# Patient Record
Sex: MALE | Race: WHITE | Employment: UNEMPLOYED | URBAN - NONMETROPOLITAN AREA
[De-identification: names, ages, dates, MRNs, and addresses within clinical notes are randomized per-mention and may not be internally consistent; named-entity substitution may affect disease eponyms.]

---

## 2018-03-08 ENCOUNTER — ANESTHESIA EVENT (OUTPATIENT)
Dept: ENDOSCOPY | Age: 63
DRG: 813 | End: 2018-03-08
Payer: MEDICARE

## 2018-03-08 ENCOUNTER — HOSPITAL ENCOUNTER (INPATIENT)
Age: 63
LOS: 3 days | Discharge: HOME | DRG: 813 | End: 2018-03-11
Attending: INTERNAL MEDICINE | Admitting: INTERNAL MEDICINE
Payer: MEDICARE

## 2018-03-08 DIAGNOSIS — E87.6 HYPOKALEMIA: Primary | ICD-10-CM

## 2018-03-08 PROBLEM — T45.511A COUMADIN TOXICITY: Status: ACTIVE | Noted: 2018-03-08

## 2018-03-08 PROBLEM — K92.2 GI BLEED: Status: ACTIVE | Noted: 2018-03-08

## 2018-03-08 PROBLEM — K92.2 GI BLEEDING: Status: ACTIVE | Noted: 2018-03-08

## 2018-03-08 PROBLEM — D62 ACUTE POST-HEMORRHAGIC ANEMIA: Status: ACTIVE | Noted: 2018-03-08

## 2018-03-08 LAB
ABO: NORMAL
ANION GAP SERPL CALCULATED.3IONS-SCNC: 9 MEQ/L (ref 8–16)
ANTIBODY SCREEN: NORMAL
BUN BLDV-MCNC: 27 MG/DL (ref 7–22)
CALCIUM SERPL-MCNC: 7.6 MG/DL (ref 8.5–10.5)
CHLORIDE BLD-SCNC: 101 MEQ/L (ref 98–111)
CO2: 27 MEQ/L (ref 23–33)
CREAT SERPL-MCNC: 0.7 MG/DL (ref 0.4–1.2)
DIGOXIN LEVEL: 0.4 NG/ML (ref 0.5–2)
GFR SERPL CREATININE-BSD FRML MDRD: > 90 ML/MIN/1.73M2
GLUCOSE BLD-MCNC: 105 MG/DL (ref 70–108)
GLUCOSE BLD-MCNC: 109 MG/DL (ref 70–108)
GLUCOSE BLD-MCNC: 111 MG/DL (ref 70–108)
HCT VFR BLD CALC: 22.4 % (ref 42–52)
HEMOGLOBIN: 7.7 GM/DL (ref 14–18)
INR BLD: 1.37 (ref 0.85–1.13)
MAGNESIUM: 2.3 MG/DL (ref 1.6–2.4)
MCH RBC QN AUTO: 29.6 PG (ref 27–31)
MCHC RBC AUTO-ENTMCNC: 34.5 GM/DL (ref 33–37)
MCV RBC AUTO: 85.7 FL (ref 80–94)
PDW BLD-RTO: 14.5 % (ref 11.5–14.5)
PLATELET # BLD: 122 THOU/MM3 (ref 130–400)
PMV BLD AUTO: 10.7 FL (ref 7.4–10.4)
POTASSIUM REFLEX MAGNESIUM: 3.1 MEQ/L (ref 3.5–5.2)
RBC # BLD: 2.62 MILL/MM3 (ref 4.7–6.1)
RH FACTOR: NORMAL
SODIUM BLD-SCNC: 137 MEQ/L (ref 135–145)
WBC # BLD: 24.3 THOU/MM3 (ref 4.8–10.8)

## 2018-03-08 PROCEDURE — 36430 TRANSFUSION BLD/BLD COMPNT: CPT

## 2018-03-08 PROCEDURE — 99291 CRITICAL CARE FIRST HOUR: CPT | Performed by: INTERNAL MEDICINE

## 2018-03-08 PROCEDURE — P9016 RBC LEUKOCYTES REDUCED: HCPCS

## 2018-03-08 PROCEDURE — 86850 RBC ANTIBODY SCREEN: CPT

## 2018-03-08 PROCEDURE — 86901 BLOOD TYPING SEROLOGIC RH(D): CPT

## 2018-03-08 PROCEDURE — 80162 ASSAY OF DIGOXIN TOTAL: CPT

## 2018-03-08 PROCEDURE — 86923 COMPATIBILITY TEST ELECTRIC: CPT

## 2018-03-08 PROCEDURE — 2580000003 HC RX 258: Performed by: INTERNAL MEDICINE

## 2018-03-08 PROCEDURE — 6360000002 HC RX W HCPCS: Performed by: INTERNAL MEDICINE

## 2018-03-08 PROCEDURE — 82948 REAGENT STRIP/BLOOD GLUCOSE: CPT

## 2018-03-08 PROCEDURE — 85610 PROTHROMBIN TIME: CPT

## 2018-03-08 PROCEDURE — 80048 BASIC METABOLIC PNL TOTAL CA: CPT

## 2018-03-08 PROCEDURE — 83735 ASSAY OF MAGNESIUM: CPT

## 2018-03-08 PROCEDURE — 6370000000 HC RX 637 (ALT 250 FOR IP): Performed by: INTERNAL MEDICINE

## 2018-03-08 PROCEDURE — 85027 COMPLETE CBC AUTOMATED: CPT

## 2018-03-08 PROCEDURE — C9113 INJ PANTOPRAZOLE SODIUM, VIA: HCPCS | Performed by: INTERNAL MEDICINE

## 2018-03-08 PROCEDURE — 36415 COLL VENOUS BLD VENIPUNCTURE: CPT

## 2018-03-08 PROCEDURE — 86900 BLOOD TYPING SEROLOGIC ABO: CPT

## 2018-03-08 PROCEDURE — 2060000000 HC ICU INTERMEDIATE R&B

## 2018-03-08 RX ORDER — SULFAMETHOXAZOLE AND TRIMETHOPRIM 400; 80 MG/1; MG/1
1 TABLET ORAL 2 TIMES DAILY
Status: ON HOLD | COMMUNITY
Start: 2018-02-28 | End: 2018-03-08 | Stop reason: ALTCHOICE

## 2018-03-08 RX ORDER — BUSPIRONE HYDROCHLORIDE 5 MG/1
5 TABLET ORAL 3 TIMES DAILY PRN
Status: DISCONTINUED | OUTPATIENT
Start: 2018-03-08 | End: 2018-03-11 | Stop reason: HOSPADM

## 2018-03-08 RX ORDER — POTASSIUM CHLORIDE 20 MEQ/1
40 TABLET, EXTENDED RELEASE ORAL ONCE
Status: COMPLETED | OUTPATIENT
Start: 2018-03-08 | End: 2018-03-08

## 2018-03-08 RX ORDER — BACLOFEN 10 MG/1
10 TABLET ORAL 3 TIMES DAILY
Status: DISCONTINUED | OUTPATIENT
Start: 2018-03-08 | End: 2018-03-08

## 2018-03-08 RX ORDER — NICOTINE POLACRILEX 4 MG
15 LOZENGE BUCCAL PRN
Status: DISCONTINUED | OUTPATIENT
Start: 2018-03-08 | End: 2018-03-11 | Stop reason: HOSPADM

## 2018-03-08 RX ORDER — ACETAMINOPHEN 325 MG/1
650 TABLET ORAL EVERY 4 HOURS PRN
Status: DISCONTINUED | OUTPATIENT
Start: 2018-03-08 | End: 2018-03-11 | Stop reason: HOSPADM

## 2018-03-08 RX ORDER — DEXTROSE MONOHYDRATE 25 G/50ML
12.5 INJECTION, SOLUTION INTRAVENOUS PRN
Status: DISCONTINUED | OUTPATIENT
Start: 2018-03-08 | End: 2018-03-11 | Stop reason: HOSPADM

## 2018-03-08 RX ORDER — ONDANSETRON 2 MG/ML
4 INJECTION INTRAMUSCULAR; INTRAVENOUS EVERY 6 HOURS PRN
Status: DISCONTINUED | OUTPATIENT
Start: 2018-03-08 | End: 2018-03-11 | Stop reason: HOSPADM

## 2018-03-08 RX ORDER — FENTANYL CITRATE 50 UG/ML
25 INJECTION, SOLUTION INTRAMUSCULAR; INTRAVENOUS
Status: DISCONTINUED | OUTPATIENT
Start: 2018-03-08 | End: 2018-03-11 | Stop reason: HOSPADM

## 2018-03-08 RX ORDER — PROMETHAZINE HYDROCHLORIDE 12.5 MG/1
12.5 TABLET ORAL EVERY 4 HOURS PRN
COMMUNITY

## 2018-03-08 RX ORDER — DEXTROSE MONOHYDRATE 50 MG/ML
100 INJECTION, SOLUTION INTRAVENOUS PRN
Status: DISCONTINUED | OUTPATIENT
Start: 2018-03-08 | End: 2018-03-11 | Stop reason: HOSPADM

## 2018-03-08 RX ORDER — POTASSIUM CHLORIDE 20 MEQ/1
40 TABLET, EXTENDED RELEASE ORAL PRN
Status: DISCONTINUED | OUTPATIENT
Start: 2018-03-08 | End: 2018-03-11 | Stop reason: HOSPADM

## 2018-03-08 RX ORDER — NITROGLYCERIN 0.4 MG/1
0.4 TABLET SUBLINGUAL EVERY 5 MIN PRN
Status: DISCONTINUED | OUTPATIENT
Start: 2018-03-08 | End: 2018-03-11 | Stop reason: HOSPADM

## 2018-03-08 RX ORDER — ZOLPIDEM TARTRATE 10 MG/1
10 TABLET ORAL NIGHTLY PRN
Status: DISCONTINUED | OUTPATIENT
Start: 2018-03-08 | End: 2018-03-11 | Stop reason: HOSPADM

## 2018-03-08 RX ORDER — POTASSIUM CHLORIDE 20 MEQ/1
20 TABLET, EXTENDED RELEASE ORAL DAILY
COMMUNITY

## 2018-03-08 RX ORDER — POTASSIUM CHLORIDE 7.45 MG/ML
10 INJECTION INTRAVENOUS PRN
Status: DISCONTINUED | OUTPATIENT
Start: 2018-03-08 | End: 2018-03-11 | Stop reason: HOSPADM

## 2018-03-08 RX ORDER — WARFARIN SODIUM 2 MG/1
TABLET ORAL DAILY
COMMUNITY

## 2018-03-08 RX ORDER — METOPROLOL TARTRATE 5 MG/5ML
2.5 INJECTION INTRAVENOUS EVERY 6 HOURS
Status: DISCONTINUED | OUTPATIENT
Start: 2018-03-08 | End: 2018-03-09

## 2018-03-08 RX ORDER — CYCLOBENZAPRINE HCL 10 MG
10 TABLET ORAL 3 TIMES DAILY PRN
COMMUNITY

## 2018-03-08 RX ORDER — DIGOXIN 0.25 MG/ML
125 INJECTION INTRAMUSCULAR; INTRAVENOUS DAILY
Status: DISCONTINUED | OUTPATIENT
Start: 2018-03-08 | End: 2018-03-09

## 2018-03-08 RX ORDER — GLIPIZIDE 10 MG/1
10 TABLET, FILM COATED, EXTENDED RELEASE ORAL DAILY
COMMUNITY

## 2018-03-08 RX ORDER — BUMETANIDE 2 MG/1
2 TABLET ORAL DAILY PRN
Status: ON HOLD | COMMUNITY
End: 2018-03-11 | Stop reason: HOSPADM

## 2018-03-08 RX ORDER — DULOXETIN HYDROCHLORIDE 60 MG/1
60 CAPSULE, DELAYED RELEASE ORAL DAILY
Status: DISCONTINUED | OUTPATIENT
Start: 2018-03-09 | End: 2018-03-11 | Stop reason: HOSPADM

## 2018-03-08 RX ORDER — ZOLPIDEM TARTRATE 10 MG/1
10 TABLET ORAL NIGHTLY PRN
COMMUNITY

## 2018-03-08 RX ORDER — METOPROLOL TARTRATE 50 MG/1
50 TABLET, FILM COATED ORAL 2 TIMES DAILY
Status: DISCONTINUED | OUTPATIENT
Start: 2018-03-08 | End: 2018-03-08

## 2018-03-08 RX ORDER — POTASSIUM CHLORIDE 20MEQ/15ML
40 LIQUID (ML) ORAL PRN
Status: DISCONTINUED | OUTPATIENT
Start: 2018-03-08 | End: 2018-03-11 | Stop reason: HOSPADM

## 2018-03-08 RX ORDER — ONDANSETRON 4 MG/1
4 TABLET, ORALLY DISINTEGRATING ORAL EVERY 4 HOURS PRN
COMMUNITY

## 2018-03-08 RX ORDER — CYCLOBENZAPRINE HCL 10 MG
10 TABLET ORAL 3 TIMES DAILY PRN
Status: DISCONTINUED | OUTPATIENT
Start: 2018-03-08 | End: 2018-03-11 | Stop reason: HOSPADM

## 2018-03-08 RX ORDER — 0.9 % SODIUM CHLORIDE 0.9 %
250 INTRAVENOUS SOLUTION INTRAVENOUS ONCE
Status: COMPLETED | OUTPATIENT
Start: 2018-03-08 | End: 2018-03-08

## 2018-03-08 RX ORDER — BUMETANIDE 2 MG/1
2 TABLET ORAL DAILY
COMMUNITY

## 2018-03-08 RX ORDER — SODIUM CHLORIDE 9 MG/ML
INJECTION, SOLUTION INTRAVENOUS CONTINUOUS
Status: DISCONTINUED | OUTPATIENT
Start: 2018-03-08 | End: 2018-03-09

## 2018-03-08 RX ORDER — FUROSEMIDE 10 MG/ML
20 INJECTION INTRAMUSCULAR; INTRAVENOUS ONCE
Status: COMPLETED | OUTPATIENT
Start: 2018-03-08 | End: 2018-03-08

## 2018-03-08 RX ORDER — SODIUM CHLORIDE 0.9 % (FLUSH) 0.9 %
10 SYRINGE (ML) INJECTION PRN
Status: DISCONTINUED | OUTPATIENT
Start: 2018-03-08 | End: 2018-03-11 | Stop reason: HOSPADM

## 2018-03-08 RX ORDER — DULOXETIN HYDROCHLORIDE 60 MG/1
60 CAPSULE, DELAYED RELEASE ORAL DAILY
COMMUNITY

## 2018-03-08 RX ORDER — ZOLPIDEM TARTRATE 5 MG/1
5 TABLET ORAL NIGHTLY PRN
Status: DISCONTINUED | OUTPATIENT
Start: 2018-03-08 | End: 2018-03-08

## 2018-03-08 RX ORDER — SODIUM CHLORIDE 0.9 % (FLUSH) 0.9 %
10 SYRINGE (ML) INJECTION EVERY 12 HOURS SCHEDULED
Status: DISCONTINUED | OUTPATIENT
Start: 2018-03-08 | End: 2018-03-11 | Stop reason: HOSPADM

## 2018-03-08 RX ORDER — DIGOXIN 125 MCG
125 TABLET ORAL DAILY
Status: DISCONTINUED | OUTPATIENT
Start: 2018-03-08 | End: 2018-03-08

## 2018-03-08 RX ADMIN — POTASSIUM CHLORIDE 40 MEQ: 1500 TABLET, EXTENDED RELEASE ORAL at 20:24

## 2018-03-08 RX ADMIN — FENTANYL CITRATE 25 MCG: 50 INJECTION INTRAMUSCULAR; INTRAVENOUS at 23:35

## 2018-03-08 RX ADMIN — FENTANYL CITRATE 25 MCG: 50 INJECTION INTRAMUSCULAR; INTRAVENOUS at 17:18

## 2018-03-08 RX ADMIN — FENTANYL CITRATE 25 MCG: 50 INJECTION INTRAMUSCULAR; INTRAVENOUS at 20:24

## 2018-03-08 RX ADMIN — SODIUM CHLORIDE: 9 INJECTION, SOLUTION INTRAVENOUS at 17:05

## 2018-03-08 RX ADMIN — FUROSEMIDE 20 MG: 10 INJECTION, SOLUTION INTRAMUSCULAR; INTRAVENOUS at 20:24

## 2018-03-08 RX ADMIN — ZOLPIDEM TARTRATE 10 MG: 10 TABLET, FILM COATED ORAL at 23:35

## 2018-03-08 RX ADMIN — SODIUM CHLORIDE 250 ML: 9 INJECTION, SOLUTION INTRAVENOUS at 18:59

## 2018-03-08 RX ADMIN — SODIUM CHLORIDE 8 MG/HR: 9 INJECTION, SOLUTION INTRAVENOUS at 17:05

## 2018-03-08 RX ADMIN — Medication 10 ML: at 20:25

## 2018-03-08 ASSESSMENT — PAIN DESCRIPTION - FREQUENCY
FREQUENCY: CONTINUOUS
FREQUENCY: CONTINUOUS

## 2018-03-08 ASSESSMENT — PAIN DESCRIPTION - PAIN TYPE
TYPE: CHRONIC PAIN
TYPE: CHRONIC PAIN

## 2018-03-08 ASSESSMENT — PAIN DESCRIPTION - LOCATION
LOCATION: BACK
LOCATION: BACK

## 2018-03-08 ASSESSMENT — PAIN SCALES - GENERAL
PAINLEVEL_OUTOF10: 10
PAINLEVEL_OUTOF10: 10
PAINLEVEL_OUTOF10: 9
PAINLEVEL_OUTOF10: 10

## 2018-03-08 ASSESSMENT — PAIN DESCRIPTION - ORIENTATION
ORIENTATION: LOWER
ORIENTATION: LOWER

## 2018-03-08 ASSESSMENT — PAIN DESCRIPTION - DIRECTION: RADIATING_TOWARDS: BILAT LOWER EXTREM

## 2018-03-08 ASSESSMENT — PAIN DESCRIPTION - ONSET: ONSET: ON-GOING

## 2018-03-08 ASSESSMENT — PAIN DESCRIPTION - DESCRIPTORS
DESCRIPTORS: CONSTANT;BURNING;TINGLING
DESCRIPTORS: ACHING;CONSTANT

## 2018-03-08 NOTE — PROGRESS NOTES
Pharmacy Medication History Note      List of current medications patient is taking is complete. Source of information: review of pharmacy dispense report, pt's daughter    Changes made to medication list:  Medications removed (include reason, ex. therapy complete or physician discontinued): · Baclofen - no longer on  · Oxycodone IR - no longer uses    Medications added/doses adjusted:  · Updated Bumex to 2 mg daily and dailyprn  · Changed Glipizide to Glipizide ER 10mg daily  · Changed Spironolactone to 50mg BID  · Changed Buspar to TIDprn  · Changed Coumadin to 2mg every other day alternating with 3mg every other day    Other notes (ex. Recent course of antibiotics, Coumadin dosing):  · Pt was recently on Bactrim DS, this was stopped due to high INR and high Potassium  · Coumadin is dosed by pt's PCP, Dr Crystal Simpson  · Denies use of other OTC or herbal medications.       Allergies reviewed      Electronically signed by Ratna Thomas, [address] [place] [address] on 3/8/2018 at 3:01 PM

## 2018-03-08 NOTE — H&P
History & Physical        Patient:  Eduardo aMta  YOB: 1955    MRN: [id number]     Acct: [de-identified]    PCP: Anastasiia Person MD    Date of Admission: 3/8/2018    Date of Service: Pt seen/examined on 3/8/18 and Admitted to Inpatient with expected LOS greater than two midnights due to medical therapy. .    Chief Complaint:  Bloody vomiting      History Of Present Illness:     70 y.o. male who presented to [address] [place] [address] from the [facility]  for further GI eval and mgt after he stayed there for  Few days when he presented with bloody vomiting and was found to be coagulopathic due to coumadin --he had an EGD and showed blood clots and the surgeon recommended referral to [facility] for further eval and treatment. His hgb was as low as 6.5 and required PRBC but despite the prbc,hgb dropped below 7. He had enough fluids. was on PPI drip. Presented with n/v and epig pain-;ocalized with LOZOYA and weakness w/o chest pain,fever,cough or diarrhea but had black to- maroon stools. No associated orthopnea or PND.had afib s/p PPM and his EF was 55% at [facility].  He was on bactrim for UTI,which could have contributed to his coagulopathy. His HR was high and was s/p Pacer interrogation,ivis said to be ok. ??     Past Medical History:          Diagnosis Date    [facility]     Brain aneurysm two aneurysm    Cardiomyopathy [facility]     CHF (congestive heart failure) (HCC)     COPD (chronic obstructive pulmonary disease) (HCC)     GERD (gastroesophageal reflux disease)     Gi Bleed    History of blood transfusion     Hyperlipidemia     [facility]rom[facility] disorder [facility]     Unspecified cerebral artery occlusion with cerebral infarction        Past Surgical History:          Procedure Laterality Date    BACK SURGERY      HIP FUSION      fusion to spine    KNEE CARTILAGE SURGERY      LYMPHADENECTOMY      OTHER SURGICAL HISTORY  2012    morphine pain pump    (128.1 kg)   SpO2 98%   BMI 38.29 kg/m²     General appearance:  No apparent distress, appears stated age and cooperative. HEENT:Pale;  Normal cephalic, atraumatic without obvious deformity. Pupils equal, round, and reactive to light. Extra ocular muscles intact. Conjunctivae/corneas clear. Neck: Supple, with full range of motion. No jugular venous distention. Trachea midline. Respiratory:  Normal respiratory effort. Few rales. Cardiovascular: Irreg-irreg; Abdomen: Mild epig tenderness; non-distended with normal bowel sounds. Musculoskeletal:  No clubbing, cyanosis or edema bilaterally. Full range of motion without deformity. Skin: Skin color, texture, turgor normal.  No rashes or lesions. Neurologic:  Neurovascularly intact without any focal sensory/motor deficits. Cranial nerves: II-XII intact, grossly non-focal.  Psychiatric:  Alert and oriented, thought content appropriate, normal insight  Capillary Refill: Brisk,< 3 seconds   Peripheral Pulses: +2 palpable, equal bilaterally       Labs:     No results for input(s): WBC, HGB, HCT, PLT in the last 72 hours. No results for input(s): NA, K, CL, CO2, BUN, CREATININE, CALCIUM, PHOS in the last 72 hours. Invalid input(s): MAGNES  No results for input(s): AST, ALT, BILIDIR, BILITOT, ALKPHOS in the last 72 hours. No results for input(s): INR in the last 72 hours. No results for input(s): [employer] in the last 72 hours.     Urinalysis:      Lab Results   Component Value Date    NITRU NEGATIVE 09/09/2014    WBCUA 50 09/09/2014    BACTERIA NONE 09/09/2014    RBCUA 5 09/09/2014    BLOODU SMALL 09/09/2014    SPECGRAV 1.012 09/09/2014       Radiology:     CXR: I have reviewed the CXR   EKG:  I have reviewed the EKG    No orders to display            DVT prophylaxis: [] Lovenox                                 [x] SCDs                                 [] SQ Heparin                                 [x] Encourage ambulation           [] Already on

## 2018-03-09 ENCOUNTER — ANESTHESIA (OUTPATIENT)
Dept: ENDOSCOPY | Age: 63
DRG: 813 | End: 2018-03-09
Payer: MEDICARE

## 2018-03-09 VITALS — OXYGEN SATURATION: 100 % | RESPIRATION RATE: 23 BRPM

## 2018-03-09 LAB
ANION GAP SERPL CALCULATED.3IONS-SCNC: 11 MEQ/L (ref 8–16)
AVERAGE GLUCOSE: 105 MG/DL (ref 70–126)
BUN BLDV-MCNC: 19 MG/DL (ref 7–22)
CALCIUM SERPL-MCNC: 7.6 MG/DL (ref 8.5–10.5)
CHLORIDE BLD-SCNC: 105 MEQ/L (ref 98–111)
CO2: 25 MEQ/L (ref 23–33)
CREAT SERPL-MCNC: 0.8 MG/DL (ref 0.4–1.2)
FOLATE: 5.3 NG/ML (ref 4.8–24.2)
GFR SERPL CREATININE-BSD FRML MDRD: > 90 ML/MIN/1.73M2
GLUCOSE BLD-MCNC: 108 MG/DL (ref 70–108)
GLUCOSE BLD-MCNC: 175 MG/DL (ref 70–108)
GLUCOSE BLD-MCNC: 178 MG/DL (ref 70–108)
GLUCOSE BLD-MCNC: 221 MG/DL (ref 70–108)
GLUCOSE BLD-MCNC: 240 MG/DL (ref 70–108)
HBA1C MFR BLD: 5.5 % (ref 4.4–6.4)
HCT VFR BLD CALC: 24.9 % (ref 42–52)
HCT VFR BLD CALC: 25.7 % (ref 42–52)
HCT VFR BLD CALC: 28.3 % (ref 42–52)
HEMOGLOBIN: 8.4 GM/DL (ref 14–18)
HEMOGLOBIN: 8.8 GM/DL (ref 14–18)
HEMOGLOBIN: 9.8 GM/DL (ref 14–18)
INR BLD: 1.28 (ref 0.85–1.13)
MCH RBC QN AUTO: 29.7 PG (ref 27–31)
MCHC RBC AUTO-ENTMCNC: 33.9 GM/DL (ref 33–37)
MCV RBC AUTO: 87.6 FL (ref 80–94)
PDW BLD-RTO: 14.5 % (ref 11.5–14.5)
PLATELET # BLD: 126 THOU/MM3 (ref 130–400)
PMV BLD AUTO: 9.7 FL (ref 7.4–10.4)
POTASSIUM SERPL-SCNC: 3.5 MEQ/L (ref 3.5–5.2)
RBC # BLD: 2.84 MILL/MM3 (ref 4.7–6.1)
SODIUM BLD-SCNC: 141 MEQ/L (ref 135–145)
TSH SERPL DL<=0.05 MIU/L-ACNC: 2.32 UIU/ML (ref 0.4–4.2)
VITAMIN B-12: 475 PG/ML (ref 211–911)
WBC # BLD: 17.8 THOU/MM3 (ref 4.8–10.8)

## 2018-03-09 PROCEDURE — 84443 ASSAY THYROID STIM HORMONE: CPT

## 2018-03-09 PROCEDURE — 3700000000 HC ANESTHESIA ATTENDED CARE: Performed by: INTERNAL MEDICINE

## 2018-03-09 PROCEDURE — 6370000000 HC RX 637 (ALT 250 FOR IP): Performed by: INTERNAL MEDICINE

## 2018-03-09 PROCEDURE — 3609012400 HC EGD TRANSORAL BIOPSY SINGLE/MULTIPLE: Performed by: INTERNAL MEDICINE

## 2018-03-09 PROCEDURE — 6360000002 HC RX W HCPCS: Performed by: NURSE ANESTHETIST, CERTIFIED REGISTERED

## 2018-03-09 PROCEDURE — 7100000000 HC PACU RECOVERY - FIRST 15 MIN: Performed by: INTERNAL MEDICINE

## 2018-03-09 PROCEDURE — 6360000002 HC RX W HCPCS: Performed by: INTERNAL MEDICINE

## 2018-03-09 PROCEDURE — 85014 HEMATOCRIT: CPT

## 2018-03-09 PROCEDURE — 80048 BASIC METABOLIC PNL TOTAL CA: CPT

## 2018-03-09 PROCEDURE — 97530 THERAPEUTIC ACTIVITIES: CPT

## 2018-03-09 PROCEDURE — 85018 HEMOGLOBIN: CPT

## 2018-03-09 PROCEDURE — 3700000001 HC ADD 15 MINUTES (ANESTHESIA): Performed by: INTERNAL MEDICINE

## 2018-03-09 PROCEDURE — G8988 SELF CARE GOAL STATUS: HCPCS

## 2018-03-09 PROCEDURE — 2580000003 HC RX 258: Performed by: INTERNAL MEDICINE

## 2018-03-09 PROCEDURE — C9113 INJ PANTOPRAZOLE SODIUM, VIA: HCPCS | Performed by: INTERNAL MEDICINE

## 2018-03-09 PROCEDURE — 36415 COLL VENOUS BLD VENIPUNCTURE: CPT

## 2018-03-09 PROCEDURE — G8987 SELF CARE CURRENT STATUS: HCPCS

## 2018-03-09 PROCEDURE — 99223 1ST HOSP IP/OBS HIGH 75: CPT | Performed by: INTERNAL MEDICINE

## 2018-03-09 PROCEDURE — 97166 OT EVAL MOD COMPLEX 45 MIN: CPT

## 2018-03-09 PROCEDURE — 86677 HELICOBACTER PYLORI ANTIBODY: CPT

## 2018-03-09 PROCEDURE — 0DB68ZX EXCISION OF STOMACH, VIA NATURAL OR ARTIFICIAL OPENING ENDOSCOPIC, DIAGNOSTIC: ICD-10-PCS | Performed by: INTERNAL MEDICINE

## 2018-03-09 PROCEDURE — 2500000003 HC RX 250 WO HCPCS: Performed by: NURSE ANESTHETIST, CERTIFIED REGISTERED

## 2018-03-09 PROCEDURE — 83036 HEMOGLOBIN GLYCOSYLATED A1C: CPT

## 2018-03-09 PROCEDURE — 6370000000 HC RX 637 (ALT 250 FOR IP): Performed by: HOSPITALIST

## 2018-03-09 PROCEDURE — 82948 REAGENT STRIP/BLOOD GLUCOSE: CPT

## 2018-03-09 PROCEDURE — 85610 PROTHROMBIN TIME: CPT

## 2018-03-09 PROCEDURE — 7100000001 HC PACU RECOVERY - ADDTL 15 MIN: Performed by: INTERNAL MEDICINE

## 2018-03-09 PROCEDURE — 82746 ASSAY OF FOLIC ACID SERUM: CPT

## 2018-03-09 PROCEDURE — 82607 VITAMIN B-12: CPT

## 2018-03-09 PROCEDURE — 85027 COMPLETE CBC AUTOMATED: CPT

## 2018-03-09 PROCEDURE — 2060000000 HC ICU INTERMEDIATE R&B

## 2018-03-09 RX ORDER — M-VIT,TX,IRON,MINS/CALC/FOLIC 27MG-0.4MG
1 TABLET ORAL DAILY
Status: DISCONTINUED | OUTPATIENT
Start: 2018-03-09 | End: 2018-03-11 | Stop reason: HOSPADM

## 2018-03-09 RX ORDER — DILTIAZEM HYDROCHLORIDE 120 MG/1
120 CAPSULE, COATED, EXTENDED RELEASE ORAL DAILY
Status: DISCONTINUED | OUTPATIENT
Start: 2018-03-09 | End: 2018-03-11 | Stop reason: HOSPADM

## 2018-03-09 RX ORDER — POTASSIUM CHLORIDE 20 MEQ/1
20 TABLET, EXTENDED RELEASE ORAL DAILY
Status: DISCONTINUED | OUTPATIENT
Start: 2018-03-09 | End: 2018-03-11 | Stop reason: HOSPADM

## 2018-03-09 RX ORDER — PANTOPRAZOLE SODIUM 40 MG/1
40 TABLET, DELAYED RELEASE ORAL
Status: DISCONTINUED | OUTPATIENT
Start: 2018-03-09 | End: 2018-03-11 | Stop reason: HOSPADM

## 2018-03-09 RX ORDER — SIMVASTATIN 20 MG
20 TABLET ORAL NIGHTLY
Status: DISCONTINUED | OUTPATIENT
Start: 2018-03-09 | End: 2018-03-11 | Stop reason: HOSPADM

## 2018-03-09 RX ORDER — PROMETHAZINE HYDROCHLORIDE 25 MG/1
12.5 TABLET ORAL EVERY 4 HOURS PRN
Status: DISCONTINUED | OUTPATIENT
Start: 2018-03-09 | End: 2018-03-11 | Stop reason: HOSPADM

## 2018-03-09 RX ORDER — PROPOFOL 10 MG/ML
INJECTION, EMULSION INTRAVENOUS PRN
Status: DISCONTINUED | OUTPATIENT
Start: 2018-03-09 | End: 2018-03-09 | Stop reason: SDUPTHER

## 2018-03-09 RX ORDER — GLIPIZIDE 10 MG/1
10 TABLET ORAL
Status: DISCONTINUED | OUTPATIENT
Start: 2018-03-10 | End: 2018-03-10

## 2018-03-09 RX ORDER — DIGOXIN 125 MCG
125 TABLET ORAL DAILY
Status: DISCONTINUED | OUTPATIENT
Start: 2018-03-10 | End: 2018-03-11 | Stop reason: HOSPADM

## 2018-03-09 RX ORDER — LIDOCAINE HYDROCHLORIDE 20 MG/ML
INJECTION, SOLUTION INFILTRATION; PERINEURAL PRN
Status: DISCONTINUED | OUTPATIENT
Start: 2018-03-09 | End: 2018-03-09 | Stop reason: SDUPTHER

## 2018-03-09 RX ORDER — METOPROLOL TARTRATE 50 MG/1
50 TABLET, FILM COATED ORAL 2 TIMES DAILY
Status: DISCONTINUED | OUTPATIENT
Start: 2018-03-09 | End: 2018-03-11 | Stop reason: HOSPADM

## 2018-03-09 RX ORDER — SPIRONOLACTONE 25 MG/1
50 TABLET ORAL 2 TIMES DAILY
Status: DISCONTINUED | OUTPATIENT
Start: 2018-03-09 | End: 2018-03-11 | Stop reason: HOSPADM

## 2018-03-09 RX ORDER — LISINOPRIL 2.5 MG/1
2.5 TABLET ORAL DAILY
Status: DISCONTINUED | OUTPATIENT
Start: 2018-03-09 | End: 2018-03-11 | Stop reason: HOSPADM

## 2018-03-09 RX ORDER — BUMETANIDE 1 MG/1
2 TABLET ORAL DAILY
Status: DISCONTINUED | OUTPATIENT
Start: 2018-03-09 | End: 2018-03-11 | Stop reason: HOSPADM

## 2018-03-09 RX ADMIN — FENTANYL CITRATE 25 MCG: 50 INJECTION INTRAMUSCULAR; INTRAVENOUS at 19:48

## 2018-03-09 RX ADMIN — PROPOFOL 20 MG: 10 INJECTION, EMULSION INTRAVENOUS at 07:25

## 2018-03-09 RX ADMIN — Medication 10 ML: at 09:00

## 2018-03-09 RX ADMIN — SIMVASTATIN 20 MG: 20 TABLET, FILM COATED ORAL at 21:16

## 2018-03-09 RX ADMIN — PANTOPRAZOLE SODIUM 40 MG: 40 TABLET, DELAYED RELEASE ORAL at 18:15

## 2018-03-09 RX ADMIN — PROPOFOL 100 MG: 10 INJECTION, EMULSION INTRAVENOUS at 07:22

## 2018-03-09 RX ADMIN — LISINOPRIL 2.5 MG: 2.5 TABLET ORAL at 13:05

## 2018-03-09 RX ADMIN — BUMETANIDE 2 MG: 1 TABLET ORAL at 13:05

## 2018-03-09 RX ADMIN — MULTIPLE VITAMINS W/ MINERALS TAB 1 TABLET: TAB at 13:05

## 2018-03-09 RX ADMIN — DULOXETINE HYDROCHLORIDE 60 MG: 60 CAPSULE, DELAYED RELEASE ORAL at 08:46

## 2018-03-09 RX ADMIN — FENTANYL CITRATE 25 MCG: 50 INJECTION INTRAMUSCULAR; INTRAVENOUS at 04:32

## 2018-03-09 RX ADMIN — ZOLPIDEM TARTRATE 10 MG: 10 TABLET, FILM COATED ORAL at 21:17

## 2018-03-09 RX ADMIN — SPIRONOLACTONE 50 MG: 25 TABLET, FILM COATED ORAL at 18:15

## 2018-03-09 RX ADMIN — FENTANYL CITRATE 25 MCG: 50 INJECTION INTRAMUSCULAR; INTRAVENOUS at 15:15

## 2018-03-09 RX ADMIN — LIDOCAINE HYDROCHLORIDE 100 MG: 20 INJECTION, SOLUTION INFILTRATION; PERINEURAL at 07:22

## 2018-03-09 RX ADMIN — Medication 10 ML: at 21:17

## 2018-03-09 RX ADMIN — POTASSIUM CHLORIDE 20 MEQ: 1500 TABLET, EXTENDED RELEASE ORAL at 13:05

## 2018-03-09 RX ADMIN — CYCLOBENZAPRINE 10 MG: 10 TABLET, FILM COATED ORAL at 21:16

## 2018-03-09 RX ADMIN — SPIRONOLACTONE 50 MG: 25 TABLET, FILM COATED ORAL at 13:06

## 2018-03-09 RX ADMIN — Medication 4 UNITS: at 13:06

## 2018-03-09 RX ADMIN — METOPROLOL TARTRATE 50 MG: 50 TABLET, FILM COATED ORAL at 21:16

## 2018-03-09 RX ADMIN — SODIUM CHLORIDE 8 MG/HR: 9 INJECTION, SOLUTION INTRAVENOUS at 02:31

## 2018-03-09 RX ADMIN — FENTANYL CITRATE 25 MCG: 50 INJECTION INTRAMUSCULAR; INTRAVENOUS at 08:45

## 2018-03-09 RX ADMIN — Medication 2 UNITS: at 18:15

## 2018-03-09 RX ADMIN — DIGOXIN 125 MCG: 0.25 INJECTION INTRAMUSCULAR; INTRAVENOUS at 08:46

## 2018-03-09 RX ADMIN — PANTOPRAZOLE SODIUM 40 MG: 40 TABLET, DELAYED RELEASE ORAL at 10:05

## 2018-03-09 RX ADMIN — CYCLOBENZAPRINE 10 MG: 10 TABLET, FILM COATED ORAL at 08:46

## 2018-03-09 RX ADMIN — INSULIN LISPRO 1 UNITS: 100 INJECTION, SOLUTION INTRAVENOUS; SUBCUTANEOUS at 21:19

## 2018-03-09 RX ADMIN — DILTIAZEM HYDROCHLORIDE 120 MG: 120 CAPSULE, COATED, EXTENDED RELEASE ORAL at 13:05

## 2018-03-09 ASSESSMENT — PAIN DESCRIPTION - LOCATION
LOCATION: BACK;LEG
LOCATION: BACK

## 2018-03-09 ASSESSMENT — PAIN DESCRIPTION - PROGRESSION: CLINICAL_PROGRESSION: NOT CHANGED

## 2018-03-09 ASSESSMENT — PAIN DESCRIPTION - PAIN TYPE
TYPE: CHRONIC PAIN

## 2018-03-09 ASSESSMENT — ENCOUNTER SYMPTOMS
DIARRHEA: 0
VOMITING: 0
NAUSEA: 0
COUGH: 0
SHORTNESS OF BREATH: 0

## 2018-03-09 ASSESSMENT — PAIN DESCRIPTION - DESCRIPTORS
DESCRIPTORS: CONSTANT;ACHING

## 2018-03-09 ASSESSMENT — PAIN DESCRIPTION - ORIENTATION
ORIENTATION: LOWER

## 2018-03-09 ASSESSMENT — PAIN DESCRIPTION - DIRECTION
RADIATING_TOWARDS: BILAT LOWER LEGS
RADIATING_TOWARDS: BILATERAL LOWER LEGS
RADIATING_TOWARDS: BILATERAL LOWER LEGS

## 2018-03-09 ASSESSMENT — PAIN DESCRIPTION - FREQUENCY
FREQUENCY: CONTINUOUS

## 2018-03-09 ASSESSMENT — PAIN SCALES - GENERAL
PAINLEVEL_OUTOF10: 9
PAINLEVEL_OUTOF10: 9
PAINLEVEL_OUTOF10: 10
PAINLEVEL_OUTOF10: 7
PAINLEVEL_OUTOF10: 8
PAINLEVEL_OUTOF10: 10
PAINLEVEL_OUTOF10: 7
PAINLEVEL_OUTOF10: 9
PAINLEVEL_OUTOF10: 7

## 2018-03-09 ASSESSMENT — PAIN DESCRIPTION - ONSET
ONSET: ON-GOING
ONSET: ON-GOING

## 2018-03-09 NOTE — PROGRESS NOTES
impairments and implications for safe return to home    Perception  Overall Perceptual Status: WFL         LUE AROM (degrees)  LUE AROM : WFL          RUE AROM (degrees)  RUE AROM : WFL       LUE Strength  Gross LUE Strength: Ex[facility] to [facility]  LUE Strength Comment: gross strength 4/5    RUE Strength  Gross RUE Strength: Exceptions to [facility]  RUE Strength Comment: gross strength 4/5    RUE Tone: Normotonic  LUE Tone: Normotonic    Movements Are Fluid And Coordinated: Yes       ADL  Toileting: Setup (while seated at EOB)  Additional Comments: Pt declined any other ADL tasks at this time. Bed mobility  Supine to Sit: Stand by assistance  Sit to Supine: Stand by assistance  Scooting: Supervision (advancing hips forward to EOB; pt was able to scoot self from end of bed to head of bed prior to returning to supine)    Transfers  Sit to stand: Contact guard assistance (from EOB; required cues for safe hand placement as pt initially attempted to stand holding onto RW)  Stand to sit: Minimal assistance (to ensure slow lower onto EOB as pt sitting prior to being instructed due to report of fatigue/weakness)    Balance  Sitting Balance: Supervision (seated at EOB)  Standing Balance: Contact guard assistance     Time: X30 seconds  Activity: prep for mobility  Comment: RW for support     Functional Mobility  Functional - Mobility Device: Rolling Walker  Activity: Other  Assist Level: Contact guard assistance  Functional Mobility Comments: Pt demoed functional mobility to end of bed, had initially planned to go to bathroom to urinate, althugh once pt arrived at end of pt he reported he needed to return to bedside due to fatigue/weakness in LE. Increased SOB noted with pt reported slight dizziness during mobility.       Activity Tolerance:  Activity Tolerance: Patient limited by fatigue  Activity Tolerance: Pt demoed decrased overall activity tolerance, unable to demo functional mobility to/from bathroom due to fatigue and report

## 2018-03-09 NOTE — ANESTHESIA PRE PROCEDURE
Take 125 mcg by mouth daily. Yes Historical Provider, MD   ondansetron (ZOFRAN-ODT) 4 MG disintegrating tablet Take 4 mg by mouth every 4 hours as needed for Nausea or Vomiting    Historical Provider, MD   promethazine (PHENERGAN) 12.5 MG tablet Take 12.5 mg by mouth every 4 hours as needed for Nausea    Historical Provider, MD   Potassium Chloride ER 20 MEQ TBCR Take 40 mEq by mouth 2 times daily. 11/11/14 2/12/15  Chastity Saver,    nitroGLYCERIN (NITROSTAT) 0.4 MG SL tablet Place 0.4 mg under the tongue every 5 minutes as needed for Chest pain. Historical Provider, MD   MORPHINE SULFATE MICROINFUSION IJ Inject 4.97 mg/day into the spine continuous. Implantable morphine pump, 4.97 mg/day, with 0.26mg/min bolus doses, 5dose bolus max/3hr. Follows with [facility] clinic    Annemarie Aguayo CNP   busPIRone (BUSPAR) 5 MG tablet Take 5 mg by mouth 3 times daily as needed (anxiety)     Historical Provider, MD   Multiple Vitamins-Minerals (THERAPEUTIC MULTIVITAMIN-MINERALS) tablet Take 1 tablet by mouth daily.     Historical Provider, MD       Current medications:    Current Facility-Administered Medications   Medication Dose Route Frequency Provider Last Rate Last Dose    sodium chloride flush 0.9 % injection 10 mL  10 mL Intravenous 2 times per day Toan Sebastian MD   10 mL at 03/08/18 2025    sodium chloride flush 0.9 % injection 10 mL  10 mL Intravenous PRN Toan Sebastian MD        acetaminophen (TYLENOL) tablet 650 mg  650 mg Oral Q4H PRN Toan Sebastian MD        ondansetron [facility]) injection 4 mg  4 mg Intravenous Q6H PRN Toan Sebastian MD        0.9 % sodium chloride infusion   Intravenous Continuous Toan Sebastian MD 20 mL/hr at 03/08/18 1705      magnesium sulfate 1 g in dextrose 5 % 100 mL IVPB  1 g Intravenous PRN Toan Sebastian MD        potassium chloride (KLOR-CON M) extended release tablet 40 mEq  40 mEq Oral PRN Toan Sebastian MD        Or    potassium chloride 20 GI bleed K92.2    GI bleeding K92.2    Acute post-hemorrhagic anemia D62    Coumadin toxicity T45.511A       Past Medical History:        Diagnosis Date    [facility]     Brain aneurysm two aneurysm    [facility]     CHF (congestive heart failure) (HCC)     COPD (chronic obstructive pulmonary disease) (HCC)     GERD (gastroesophageal reflux disease)     Gi Bleed    History of blood transfusion     Hyperlipidemia     [facility]     Unspecified cerebral artery occlusion with cerebral infarction        Past Surgical History:        Procedure Laterality Date    BACK SURGERY      HIP FUSION      fusion to spine    KNEE CARTILAGE SURGERY      LYMPHADENECTOMY      OTHER SURGICAL HISTORY  2012    morphine pain pump    PACEMAKER PLACEMENT  2011       Social History:    Social History   Substance Use Topics    Smoking status: Former Smoker     Packs/day: 0.25     Types: Cigarettes     Quit date: 2/8/2008    Smokeless tobacco: Never Used    Alcohol use No                                Counseling given: Not Answered      Vital Signs (Current):   Vitals:    03/08/18 2340 03/09/18 0422 03/09/18 0437 03/09/18 0701   BP: 131/63 (!) 128/59  137/62   Pulse: 87 86  90   Resp: 18 20 19   Temp: 36.9 °C (98.5 °F) 37.1 °C (98.7 °F)     TempSrc: Oral Oral     SpO2: 97% 97%  96%   Weight:   280 lb 9.6 oz (127.3 kg)    Height:                                                  BP Readings from Last 3 Encounters:   03/09/18 137/62   10/30/14 118/64   09/23/14 100/62       NPO Status: Time of last liquid consumption: 2100 (sips with pills)                        Time of last solid consumption: 1600                        Date of last liquid consumption: 03/08/18                        Date of last solid food consumption: 03/05/18    BMI:   Wt Readings from Last 3 Encounters:   03/09/18 280 lb 9.6 oz (127.3 kg)   10/30/14 280 lb (127 kg)   09/23/14 279 lb 14.4 oz (127 kg)     Body mass index is 38.06 kg/m². CBC:   Lab Results   Component Value Date    WBC 17.8 03/09/2018    RBC 2.84 03/09/2018    HGB 8.4 03/09/2018    HCT 24.9 03/09/2018    MCV 87.6 03/09/2018    RDW 14.5 03/09/2018     03/09/2018       CMP:   Lab Results   Component Value Date     03/09/2018    K 3.5 03/09/2018    K 3.1 03/08/2018     03/09/2018    CO2 25 03/09/2018    BUN 19 03/09/2018    CREATININE 0.8 03/09/2018    LABGLOM >90 03/09/2018    GLUCOSE 108 03/09/2018    GLUCOSE 107 10/27/2014    PROT 6.9 09/09/2014    CALCIUM 7.6 03/09/2018    BILITOT 0.3 09/09/2014    ALKPHOS 76 09/09/2014    AST 57 09/09/2014    ALT 33 09/09/2014       POC Tests:   Recent Labs      03/08/18 2022   POCGLU  111*       Coags:   Lab Results   Component Value Date    INR 1.28 03/09/2018       HCG (If Applicable): No results found for: PREGTESTUR, PREGSERUM, HCG, HCGQUANT     ABGs: No results found for: PHART, PO2ART, PBF2DWR, SWO9ULE, BEART, P7CWGTPS     Type & Screen (If Applicable):  Lab Results   Component Value Date    [address] 03/08/2018       Anesthesia Evaluation  Patient summary reviewed and Nursing notes reviewed  Airway: Mallampati: III  TM distance: >3 FB   Neck ROM: full  Mouth opening: > = 3 FB Dental:    (+) edentulous      Pulmonary:normal exam    (+) COPD:                             Cardiovascular:  Exercise tolerance: poor (<4 METS),   (+) pacemaker: pacemaker, dysrhythmias: atrial fibrillation, CHF:,       ECG reviewed  Rhythm: irregular  Rate: normal           Beta Blocker:  Dose within 24 Hrs         Neuro/Psych:   (+) CVA: residual symptoms,             GI/Hepatic/Renal:   (+) GERD:, morbid obesity          Endo/Other:    (+) Diabetespoorly controlled, , .                 Abdominal:           Vascular: negative vascular ROS. Anesthesia Plan      MAC     ASA 3       Induction: intravenous. Anesthetic plan and risks discussed with patient.       Plan discussed

## 2018-03-09 NOTE — CONSULTS
# [id number]  Please see my dictated report.
Pt seen examined. Consult dictation to follow.   Suspect u gi bleeding.  p- protonix iv  egd at 7.15 am
Provider, MD   bumetanide (BUMEX) 2 MG tablet Take 2 mg by mouth daily   Yes Historical Provider, MD   bumetanide (BUMEX) 2 MG tablet Take 2 mg by mouth daily as needed (swelling)   Yes Historical Provider, MD   warfarin (COUMADIN) 2 MG tablet Take by mouth daily Takes 2 mg every other day alternating with 3 mg every other day   Yes Historical Provider, MD   potassium chloride (KLOR-CON M) 20 MEQ extended release tablet Take 20 mEq by mouth daily   Yes Historical Provider, MD   metoprolol (LOPRESSOR) 50 MG tablet Take 1 tablet by mouth 2 times daily. 1/12/15  Yes Tran Walton MD   diltiazem (CARDIZEM CD) 120 MG ER capsule Take 1 capsule by mouth daily. 9/16/14  Yes Rolando Perez CNP   spironolactone (ALDACTONE) 50 MG tablet Take 1 tablet by mouth 2 times daily. 9/16/14  Yes Keke Casillas CNP   lisinopril (PRINIVIL;ZESTRIL) 2.5 MG tablet Take 2.5 mg by mouth daily. Yes Historical Provider, MD   lovastatin (MEVACOR) 40 MG tablet Take 40 mg by mouth nightly. Yes Historical Provider, MD   digoxin (LANOXIN) 125 MCG tablet Take 125 mcg by mouth daily. Yes Historical Provider, MD   ondansetron (ZOFRAN-ODT) 4 MG disintegrating tablet Take 4 mg by mouth every 4 hours as needed for Nausea or Vomiting    Historical Provider, MD   promethazine (PHENERGAN) 12.5 MG tablet Take 12.5 mg by mouth every 4 hours as needed for Nausea    Historical Provider, MD   Potassium Chloride ER 20 MEQ TBCR Take 40 mEq by mouth 2 times daily. 11/11/14 2/12/15  Jose Angel Mc DO   nitroGLYCERIN (NITROSTAT) 0.4 MG SL tablet Place 0.4 mg under the tongue every 5 minutes as needed for Chest pain. Historical Provider, MD   MORPHINE SULFATE MICROINFUSION IJ Inject 4.97 mg/day into the spine continuous. Implantable morphine pump, 4.97 mg/day, with 0.26mg/min bolus doses, 5dose bolus max/3hr.   Follows with [place] pain clinic    Dylan Engle CNP   busPIRone (BUSPAR) 5 MG tablet Take 5 mg by mouth 3 times daily as needed

## 2018-03-09 NOTE — ANESTHESIA POSTPROCEDURE EVALUATION
Department of Anesthesiology  Postprocedure Note    Patient: Randolph Martinez  MRN: [id number]  YOB: 1955  Date of evaluation: 3/9/2018  Time:  7:30 AM     Procedure Summary     Date:  03/09/18 Room / Location:  [facility] / [facility]    Anesthesia Start:  0718 Anesthesia Stop:  0730    Procedure:  EGD BIOPSY (Left ) Diagnosis:  (GI BLEED)    Surgeon:  Ney Arnold MD Responsible Provider:  Sree Puentes DO    Anesthesia Type:  MAC ASA Status:  3          Anesthesia Type: MAC    Percy Phase I: Percy Score: 10    Percy Phase II:      Last vitals: Reviewed and per EMR flowsheets.        Anesthesia Post Evaluation    Patient location during evaluation: bedside  Patient participation: complete - patient participated  Level of consciousness: awake and alert  Airway patency: patent  Nausea & Vomiting: no nausea and no vomiting  Complications: no  Cardiovascular status: hemodynamically stable  Respiratory status: room air, spontaneous ventilation and acceptable  Hydration status: euvolemic

## 2018-03-10 PROBLEM — K26.9 MULTIPLE DUODENAL ULCERS: Status: ACTIVE | Noted: 2018-03-10

## 2018-03-10 PROBLEM — K25.9 MULTIPLE GASTRIC ULCERS: Status: ACTIVE | Noted: 2018-03-10

## 2018-03-10 LAB
ANION GAP SERPL CALCULATED.3IONS-SCNC: 13 MEQ/L (ref 8–16)
BUN BLDV-MCNC: 13 MG/DL (ref 7–22)
CALCIUM SERPL-MCNC: 7.7 MG/DL (ref 8.5–10.5)
CHLORIDE BLD-SCNC: 101 MEQ/L (ref 98–111)
CHOLESTEROL, TOTAL: 93 MG/DL (ref 100–199)
CO2: 26 MEQ/L (ref 23–33)
CREAT SERPL-MCNC: 0.8 MG/DL (ref 0.4–1.2)
GFR SERPL CREATININE-BSD FRML MDRD: > 90 ML/MIN/1.73M2
GLUCOSE BLD-MCNC: 130 MG/DL (ref 70–108)
GLUCOSE BLD-MCNC: 148 MG/DL (ref 70–108)
GLUCOSE BLD-MCNC: 163 MG/DL (ref 70–108)
GLUCOSE BLD-MCNC: 164 MG/DL (ref 70–108)
GLUCOSE BLD-MCNC: 174 MG/DL (ref 70–108)
HCT VFR BLD CALC: 26.8 % (ref 42–52)
HDLC SERPL-MCNC: 23 MG/DL
HEMOGLOBIN: 9.1 GM/DL (ref 14–18)
INR BLD: 1.12 (ref 0.85–1.13)
LDL CHOLESTEROL CALCULATED: 44 MG/DL
MCH RBC QN AUTO: 29.6 PG (ref 27–31)
MCHC RBC AUTO-ENTMCNC: 33.8 GM/DL (ref 33–37)
MCV RBC AUTO: 87.6 FL (ref 80–94)
PDW BLD-RTO: 14.5 % (ref 11.5–14.5)
PLATELET # BLD: 138 THOU/MM3 (ref 130–400)
PMV BLD AUTO: 9.4 FL (ref 7.4–10.4)
POTASSIUM SERPL-SCNC: 3.4 MEQ/L (ref 3.5–5.2)
RBC # BLD: 3.07 MILL/MM3 (ref 4.7–6.1)
SODIUM BLD-SCNC: 140 MEQ/L (ref 135–145)
TRIGL SERPL-MCNC: 129 MG/DL (ref 0–199)
UREASE-CLO-C. PYLORI: NEGATIVE
WBC # BLD: 13 THOU/MM3 (ref 4.8–10.8)

## 2018-03-10 PROCEDURE — 99231 SBSQ HOSP IP/OBS SF/LOW 25: CPT | Performed by: PHYSICIAN ASSISTANT

## 2018-03-10 PROCEDURE — 6370000000 HC RX 637 (ALT 250 FOR IP)

## 2018-03-10 PROCEDURE — 6370000000 HC RX 637 (ALT 250 FOR IP): Performed by: INTERNAL MEDICINE

## 2018-03-10 PROCEDURE — 85027 COMPLETE CBC AUTOMATED: CPT

## 2018-03-10 PROCEDURE — 82948 REAGENT STRIP/BLOOD GLUCOSE: CPT

## 2018-03-10 PROCEDURE — 80048 BASIC METABOLIC PNL TOTAL CA: CPT

## 2018-03-10 PROCEDURE — 2580000003 HC RX 258: Performed by: INTERNAL MEDICINE

## 2018-03-10 PROCEDURE — 6360000002 HC RX W HCPCS: Performed by: INTERNAL MEDICINE

## 2018-03-10 PROCEDURE — 6370000000 HC RX 637 (ALT 250 FOR IP): Performed by: HOSPITALIST

## 2018-03-10 PROCEDURE — 99232 SBSQ HOSP IP/OBS MODERATE 35: CPT | Performed by: INTERNAL MEDICINE

## 2018-03-10 PROCEDURE — 36415 COLL VENOUS BLD VENIPUNCTURE: CPT

## 2018-03-10 PROCEDURE — 80061 LIPID PANEL: CPT

## 2018-03-10 PROCEDURE — 85610 PROTHROMBIN TIME: CPT

## 2018-03-10 PROCEDURE — 2060000000 HC ICU INTERMEDIATE R&B

## 2018-03-10 RX ORDER — GLIPIZIDE 10 MG/1
10 TABLET ORAL
Status: DISCONTINUED | OUTPATIENT
Start: 2018-03-12 | End: 2018-03-11 | Stop reason: HOSPADM

## 2018-03-10 RX ORDER — POTASSIUM CHLORIDE 750 MG/1
40 TABLET, FILM COATED, EXTENDED RELEASE ORAL ONCE
Status: COMPLETED | OUTPATIENT
Start: 2018-03-10 | End: 2018-03-10

## 2018-03-10 RX ORDER — POLYETHYLENE GLYCOL 3350 17 G/17G
17 POWDER, FOR SOLUTION ORAL
Status: DISCONTINUED | OUTPATIENT
Start: 2018-03-10 | End: 2018-03-11 | Stop reason: HOSPADM

## 2018-03-10 RX ADMIN — Medication 2 UNITS: at 09:52

## 2018-03-10 RX ADMIN — FENTANYL CITRATE 25 MCG: 50 INJECTION INTRAMUSCULAR; INTRAVENOUS at 16:29

## 2018-03-10 RX ADMIN — Medication 10 ML: at 09:56

## 2018-03-10 RX ADMIN — POLYETHYLENE GLYCOL 3350 17 G: 17 POWDER, FOR SOLUTION ORAL at 16:15

## 2018-03-10 RX ADMIN — POLYETHYLENE GLYCOL 3350 17 G: 17 POWDER, FOR SOLUTION ORAL at 14:23

## 2018-03-10 RX ADMIN — POTASSIUM CHLORIDE 40 MEQ: 750 TABLET, FILM COATED, EXTENDED RELEASE ORAL at 09:53

## 2018-03-10 RX ADMIN — POLYETHYLENE GLYCOL 3350 17 G: 17 POWDER, FOR SOLUTION ORAL at 20:00

## 2018-03-10 RX ADMIN — METOPROLOL TARTRATE 50 MG: 50 TABLET, FILM COATED ORAL at 09:54

## 2018-03-10 RX ADMIN — POLYETHYLENE GLYCOL 3350 17 G: 17 POWDER, FOR SOLUTION ORAL at 23:13

## 2018-03-10 RX ADMIN — POLYETHYLENE GLYCOL 3350 17 G: 17 POWDER, FOR SOLUTION ORAL at 13:07

## 2018-03-10 RX ADMIN — CYCLOBENZAPRINE 10 MG: 10 TABLET, FILM COATED ORAL at 23:13

## 2018-03-10 RX ADMIN — BISACODYL 10 MG: 5 TABLET, DELAYED RELEASE ORAL at 11:44

## 2018-03-10 RX ADMIN — SIMVASTATIN 20 MG: 20 TABLET, FILM COATED ORAL at 21:17

## 2018-03-10 RX ADMIN — METOPROLOL TARTRATE 50 MG: 50 TABLET, FILM COATED ORAL at 21:17

## 2018-03-10 RX ADMIN — POTASSIUM CHLORIDE 20 MEQ: 1500 TABLET, EXTENDED RELEASE ORAL at 09:54

## 2018-03-10 RX ADMIN — BUMETANIDE 2 MG: 1 TABLET ORAL at 09:54

## 2018-03-10 RX ADMIN — ZOLPIDEM TARTRATE 10 MG: 10 TABLET, FILM COATED ORAL at 23:13

## 2018-03-10 RX ADMIN — GLIPIZIDE 10 MG: 10 TABLET ORAL at 07:01

## 2018-03-10 RX ADMIN — FENTANYL CITRATE 25 MCG: 50 INJECTION INTRAMUSCULAR; INTRAVENOUS at 12:51

## 2018-03-10 RX ADMIN — PANTOPRAZOLE SODIUM 40 MG: 40 TABLET, DELAYED RELEASE ORAL at 07:01

## 2018-03-10 RX ADMIN — LISINOPRIL 2.5 MG: 2.5 TABLET ORAL at 09:54

## 2018-03-10 RX ADMIN — PANTOPRAZOLE SODIUM 40 MG: 40 TABLET, DELAYED RELEASE ORAL at 15:13

## 2018-03-10 RX ADMIN — MULTIPLE VITAMINS W/ MINERALS TAB 1 TABLET: TAB at 09:54

## 2018-03-10 RX ADMIN — INSULIN LISPRO 1 UNITS: 100 INJECTION, SOLUTION INTRAVENOUS; SUBCUTANEOUS at 21:17

## 2018-03-10 RX ADMIN — DULOXETINE HYDROCHLORIDE 60 MG: 60 CAPSULE, DELAYED RELEASE ORAL at 09:53

## 2018-03-10 RX ADMIN — POLYETHYLENE GLYCOL 3350 17 G: 17 POWDER, FOR SOLUTION ORAL at 18:24

## 2018-03-10 RX ADMIN — POLYETHYLENE GLYCOL 3350 17 G: 17 POWDER, FOR SOLUTION ORAL at 22:00

## 2018-03-10 RX ADMIN — DIGOXIN 125 MCG: 0.12 TABLET ORAL at 09:54

## 2018-03-10 RX ADMIN — POLYETHYLENE GLYCOL 3350 17 G: 17 POWDER, FOR SOLUTION ORAL at 17:24

## 2018-03-10 RX ADMIN — IRON SUCROSE 200 MG: 20 INJECTION, SOLUTION INTRAVENOUS at 11:44

## 2018-03-10 RX ADMIN — POLYETHYLENE GLYCOL 3350 17 G: 17 POWDER, FOR SOLUTION ORAL at 19:14

## 2018-03-10 RX ADMIN — SPIRONOLACTONE 50 MG: 25 TABLET, FILM COATED ORAL at 18:26

## 2018-03-10 RX ADMIN — FENTANYL CITRATE 25 MCG: 50 INJECTION INTRAMUSCULAR; INTRAVENOUS at 04:00

## 2018-03-10 RX ADMIN — POLYETHYLENE GLYCOL 3350 17 G: 17 POWDER, FOR SOLUTION ORAL at 15:09

## 2018-03-10 RX ADMIN — POLYETHYLENE GLYCOL 3350 17 G: 17 POWDER, FOR SOLUTION ORAL at 11:45

## 2018-03-10 RX ADMIN — DILTIAZEM HYDROCHLORIDE 120 MG: 120 CAPSULE, COATED, EXTENDED RELEASE ORAL at 09:54

## 2018-03-10 RX ADMIN — POLYETHYLENE GLYCOL 3350 17 G: 17 POWDER, FOR SOLUTION ORAL at 20:54

## 2018-03-10 RX ADMIN — SPIRONOLACTONE 50 MG: 25 TABLET, FILM COATED ORAL at 09:54

## 2018-03-10 ASSESSMENT — PAIN DESCRIPTION - ORIENTATION: ORIENTATION: LOWER

## 2018-03-10 ASSESSMENT — PAIN SCALES - GENERAL
PAINLEVEL_OUTOF10: 6
PAINLEVEL_OUTOF10: 8
PAINLEVEL_OUTOF10: 10
PAINLEVEL_OUTOF10: 9

## 2018-03-10 ASSESSMENT — PAIN DESCRIPTION - DESCRIPTORS: DESCRIPTORS: CONSTANT

## 2018-03-10 ASSESSMENT — PAIN DESCRIPTION - LOCATION: LOCATION: BACK

## 2018-03-10 ASSESSMENT — PAIN DESCRIPTION - PAIN TYPE: TYPE: CHRONIC PAIN

## 2018-03-10 NOTE — PROGRESS NOTES
Progress note: GI    Patient - Mabel Teran,  Age - 70 y.o.    - 1955      Room Number - 4B-07/007-A   MRN -  [id number]   Acct # - [de-identified]  Date of Admission -  3/8/2018 11:26 AM    SUBJECTIVE:   Pain is better. No n,v.2 black stools. OBJECTIVE   VITALS    height is 6' (1.829 m) and weight is 260 lb 3.2 oz (118 kg). His oral temperature is 98.8 °F (37.1 °C). His blood pressure is 123/62 and his pulse is 77. His respiration is 20 and oxygen saturation is 100%. Wt Readings from Last 3 Encounters:   03/10/18 260 lb 3.2 oz (118 kg)   10/30/14 280 lb (127 kg)   14 279 lb 14.4 oz (127 kg)       I/O (24 Hours)    Intake/Output Summary (Last 24 hours) at 03/10/18 1021  Last data filed at 03/10/18 0430   Gross per 24 hour   Intake             1644 ml   Output             2225 ml   Net             -581 ml       General Appearance  Awake, alert, oriented,  not  In acute distress  HEENT - normocephalic, atraumatic, pink conjunctiva,  anicteric sclera  Neck - Supple, no mass  Lungs -  Bilateral good air entry, no rhonchi, no wheeze  Cardiovascular - Heart sounds are normal.  Regular rate and rhythm without murmur, gallop or rub. Abdomen - soft, not distended, nontender, no organomegally, morphine pump   Neurologic - Awake, alert, oriented to name, place and time. no deficit  Skin - No bruising or bleeding  Extremities - + edema, no cyanosis, clubbing     MEDICATIONS:      pantoprazole  40 mg Oral BID AC    glipiZIDE  10 mg Oral QAM AC    potassium chloride  20 mEq Oral Daily    digoxin  125 mcg Oral Daily    metoprolol tartrate  50 mg Oral BID    diltiazem  120 mg Oral Daily    lisinopril  2.5 mg Oral Daily    spironolactone  50 mg Oral BID    therapeutic multivitamin-minerals  1 tablet Oral Daily    simvastatin  20 mg Oral Nightly    bumetanide  2 mg Oral Daily    sodium chloride flush  10 mL Intravenous 2 times per day    DULoxetine  60 mg Oral Daily    insulin lispro  0-12 Units

## 2018-03-10 NOTE — PROGRESS NOTES
clear.  Neck: Supple, with full range of motion. No jugular venous distention. Trachea midline. Respiratory:  Normal respiratory effort. Clear to auscultation, bilaterally without Rales/Wheezes/Rhonchi. Cardiovascular:  Irregular rhythm  Abdomen: Soft, non-tender, non-distended with normal bowel sounds. Musculoskeletal: passive and active ROM x 4 extremities. Skin: Skin color, texture, turgor normal.  No rashes or lesions. Neurologic:  Neurovascularly intact without any focal sensory/motor deficits. Cranial nerves: II-XII intact, grossly non-focal.  Psychiatric: Alert and oriented, thought content appropriate, normal insight        Labs:   Recent Labs      03/08/18   1525   03/09/18   0605  03/09/18   2255  03/10/18   0425   WBC  24.3*   --   17.8*   --   13.0*   HGB  7.7*   < >  8.4*  9.8*  9.1*   HCT  22.4*   < >  24.9*  28.3*  26.8*   PLT  122*   --   126*   --   138    < > = values in this interval not displayed. Recent Labs      03/08/18   1525  03/09/18   0605  03/10/18   0425   NA  137  141  140   K  3.1*  3.5  3.4*   CL  101  105  101   CO2  27  25  26   BUN  27*  19  13   CREATININE  0.7  0.8  0.8   CALCIUM  7.6*  7.6*  7.7*     No results for input(s): AST, ALT, BILIDIR, BILITOT, ALKPHOS in the last 72 hours. Recent Labs      03/08/18   1525  03/09/18   0605  03/10/18   0420   INR  1.37*  1.28*  1.12     No results for input(s): CKTOTAL, TROPONINI in the last 72 hours.     Urinalysis:    Lab Results   Component Value Date    NITRU NEGATIVE 09/09/2014    WBCUA 50 09/09/2014    BACTERIA NONE 09/09/2014    RBCUA 5 09/09/2014    BLOODU SMALL 09/09/2014    SPECGRAV 1.012 09/09/2014       Radiology:  No orders to display       Diet: DIET LOW FAT;    DVT prophylaxis: [] Lovenox                                 [x] SCDs                                 [] SQ Heparin                                 [] Encourage ambulation           [] Already on Anticoagulation     Disposition:    [x] Home       [] TCU [] Rehab       [] Psych       [] SNF       [] [place]       [] Other-    Code Status: Full Code    PT/OT Eval Status:  ordered      Assessment/Plan:    Anticipated Discharge in : [address] Problems    Diagnosis Date Noted    Multiple gastric ulcers [K25.9] 03/10/2018    Multiple duodenal ulcers [K29.81] 03/10/2018    GI bleed [K92.2] 03/08/2018    Acute post-hemorrhagic anemia [D62] 03/08/2018    Coumadin toxicity [T45.511A] 03/08/2018    Chronic back pain [M54.9, G89.29] 09/10/2014    Atrial fibrillation ([facility] [I48.91] 09/10/2014       1. Gi bleed- discussed with Dr Mahesh Hughes who plans colonoscopy am then home if ok; could resume anticoagulation then  2.  Will mobilize        Electronically signed by Diann Potter MD on 3/10/2018 at 10:22 AM

## 2018-03-10 NOTE — PROGRESS NOTES
10 mg Oral Once    iron sucrose  200 mg Intravenous Once    pantoprazole  40 mg Oral BID AC    potassium chloride  20 mEq Oral Daily    digoxin  125 mcg Oral Daily    metoprolol tartrate  50 mg Oral BID    diltiazem  120 mg Oral Daily    lisinopril  2.5 mg Oral Daily    spironolactone  50 mg Oral BID    therapeutic multivitamin-minerals  1 tablet Oral Daily    simvastatin  20 mg Oral Nightly    bumetanide  2 mg Oral Daily    sodium chloride flush  10 mL Intravenous 2 times per day    DULoxetine  60 mg Oral Daily    insulin lispro  0-6 Units Subcutaneous Nightly      dextrose         promethazine 12.5 mg Q4H PRN   sodium chloride flush 10 mL PRN   acetaminophen 650 mg Q4H PRN   ondansetron 4 mg Q6H PRN   magnesium sulfate 1 g PRN   potassium chloride 40 mEq PRN   Or     potassium chloride 40 mEq PRN   Or     potassium chloride 10 mEq PRN   busPIRone 5 mg TID PRN   cyclobenzaprine 10 mg TID PRN   nitroGLYCERIN 0.4 mg Q5 Min PRN   zolpidem 10 mg Nightly PRN   glucose 15 g PRN   dextrose 12.5 g PRN   glucagon (rDNA) 1 mg PRN   dextrose 100 mL/hr PRN   fentanNYL 25 mcg Q1H PRN       Lab Data:    Cardiac Enzymes:  No results for input(s): CKTOTAL, CKMB, CKMBINDEX, TROPONINI in the last 72 hours.     CBC:   Lab Results   Component Value Date    WBC 13.0 03/10/2018    RBC 3.07 03/10/2018    HGB 9.1 03/10/2018    HCT 26.8 03/10/2018     03/10/2018       CMP:  Lab Results   Component Value Date     03/10/2018    K 3.4 03/10/2018    K 3.1 03/08/2018     03/10/2018    CO2 26 03/10/2018    BUN 13 03/10/2018    CREATININE 0.8 03/10/2018    LABGLOM >90 03/10/2018    GLUCOSE 130 03/10/2018    GLUCOSE 107 10/27/2014    CALCIUM 7.7 03/10/2018       Hepatic Function Panel:  Lab Results   Component Value Date    ALKPHOS 76 09/09/2014    ALT 33 09/09/2014    AST 57 09/09/2014    PROT 6.9 09/09/2014    BILITOT 0.3 09/09/2014    BILIDIR <0.2 09/09/2014    LABALBU 3.2 09/09/2014       Magnesium:    Lab Results   Component Value Date    MG 2.3 03/08/2018       PT/INR:    Lab Results   Component Value Date    INR 1.12 03/10/2018       HgBA1c:    Lab Results   Component Value Date    LABA1C 5.5 03/09/2018       FLP:  Lab Results   Component Value Date    TRIG 129 03/10/2018    HDL 23 03/10/2018    LDLCALC 44 03/10/2018       TSH:    Lab Results   Component Value Date    TSH 2.320 03/09/2018         Assessment:    Acute GIB - GI following  Chronic afib  Hx PPM  Chronic diastolic CHF  HTN  HLP  Hx CVA    Plan:  · Cont statin/bb/cdz/ace/dig/ace  · Restart coumadin once ok with GI  · F/up primary cardiology at [facility] 2 weeks  · Will see prn         Electronically signed by Valentino White PA-C on 3/10/2018 at 11:16 AM

## 2018-03-11 VITALS
SYSTOLIC BLOOD PRESSURE: 125 MMHG | HEIGHT: 72 IN | BODY MASS INDEX: 35.24 KG/M2 | RESPIRATION RATE: 16 BRPM | WEIGHT: 260.2 LBS | TEMPERATURE: 97.9 F | OXYGEN SATURATION: 96 % | DIASTOLIC BLOOD PRESSURE: 60 MMHG | HEART RATE: 73 BPM

## 2018-03-11 LAB
ANION GAP SERPL CALCULATED.3IONS-SCNC: 13 MEQ/L (ref 8–16)
BUN BLDV-MCNC: 8 MG/DL (ref 7–22)
CALCIUM SERPL-MCNC: 8.4 MG/DL (ref 8.5–10.5)
CHLORIDE BLD-SCNC: 100 MEQ/L (ref 98–111)
CO2: 26 MEQ/L (ref 23–33)
CREAT SERPL-MCNC: 0.7 MG/DL (ref 0.4–1.2)
GFR SERPL CREATININE-BSD FRML MDRD: > 90 ML/MIN/1.73M2
GLUCOSE BLD-MCNC: 124 MG/DL (ref 70–108)
GLUCOSE BLD-MCNC: 129 MG/DL (ref 70–108)
HCT VFR BLD CALC: 28.1 % (ref 42–52)
HEMOGLOBIN: 9.5 GM/DL (ref 14–18)
POTASSIUM SERPL-SCNC: 3.3 MEQ/L (ref 3.5–5.2)
SODIUM BLD-SCNC: 139 MEQ/L (ref 135–145)

## 2018-03-11 PROCEDURE — 6370000000 HC RX 637 (ALT 250 FOR IP): Performed by: INTERNAL MEDICINE

## 2018-03-11 PROCEDURE — 88305 TISSUE EXAM BY PATHOLOGIST: CPT

## 2018-03-11 PROCEDURE — 0DBP8ZX EXCISION OF RECTUM, VIA NATURAL OR ARTIFICIAL OPENING ENDOSCOPIC, DIAGNOSTIC: ICD-10-PCS | Performed by: INTERNAL MEDICINE

## 2018-03-11 PROCEDURE — 6360000002 HC RX W HCPCS: Performed by: INTERNAL MEDICINE

## 2018-03-11 PROCEDURE — 99152 MOD SED SAME PHYS/QHP 5/>YRS: CPT | Performed by: INTERNAL MEDICINE

## 2018-03-11 PROCEDURE — 82948 REAGENT STRIP/BLOOD GLUCOSE: CPT

## 2018-03-11 PROCEDURE — 99239 HOSP IP/OBS DSCHRG MGMT >30: CPT | Performed by: INTERNAL MEDICINE

## 2018-03-11 PROCEDURE — 99153 MOD SED SAME PHYS/QHP EA: CPT | Performed by: INTERNAL MEDICINE

## 2018-03-11 PROCEDURE — 6370000000 HC RX 637 (ALT 250 FOR IP): Performed by: HOSPITALIST

## 2018-03-11 PROCEDURE — 6370000000 HC RX 637 (ALT 250 FOR IP)

## 2018-03-11 PROCEDURE — 2580000003 HC RX 258: Performed by: INTERNAL MEDICINE

## 2018-03-11 PROCEDURE — 36415 COLL VENOUS BLD VENIPUNCTURE: CPT

## 2018-03-11 PROCEDURE — 85018 HEMOGLOBIN: CPT

## 2018-03-11 PROCEDURE — 3609010600 HC COLONOSCOPY POLYPECTOMY SNARE/COLD BIOPSY: Performed by: INTERNAL MEDICINE

## 2018-03-11 PROCEDURE — 85014 HEMATOCRIT: CPT

## 2018-03-11 PROCEDURE — 80048 BASIC METABOLIC PNL TOTAL CA: CPT

## 2018-03-11 RX ORDER — FENTANYL CITRATE 50 UG/ML
INJECTION, SOLUTION INTRAMUSCULAR; INTRAVENOUS PRN
Status: DISCONTINUED | OUTPATIENT
Start: 2018-03-11 | End: 2018-03-11 | Stop reason: HOSPADM

## 2018-03-11 RX ORDER — ASCORBIC ACID 500 MG
TABLET ORAL
Qty: 30 TABLET | Refills: 3 | COMMUNITY
Start: 2018-03-11

## 2018-03-11 RX ORDER — MIDAZOLAM HYDROCHLORIDE 1 MG/ML
INJECTION INTRAMUSCULAR; INTRAVENOUS PRN
Status: DISCONTINUED | OUTPATIENT
Start: 2018-03-11 | End: 2018-03-11 | Stop reason: HOSPADM

## 2018-03-11 RX ORDER — FERROUS SULFATE 325(65) MG
TABLET ORAL
Qty: 30 TABLET | Refills: 3 | COMMUNITY
Start: 2018-03-11

## 2018-03-11 RX ORDER — PANTOPRAZOLE SODIUM 40 MG/1
40 TABLET, DELAYED RELEASE ORAL
Qty: 30 TABLET | Refills: 3 | Status: SHIPPED | OUTPATIENT
Start: 2018-03-11

## 2018-03-11 RX ADMIN — POLYETHYLENE GLYCOL 3350 17 G: 17 POWDER, FOR SOLUTION ORAL at 04:00

## 2018-03-11 RX ADMIN — LISINOPRIL 2.5 MG: 2.5 TABLET ORAL at 11:24

## 2018-03-11 RX ADMIN — DIGOXIN 125 MCG: 0.12 TABLET ORAL at 11:23

## 2018-03-11 RX ADMIN — PANTOPRAZOLE SODIUM 40 MG: 40 TABLET, DELAYED RELEASE ORAL at 06:25

## 2018-03-11 RX ADMIN — POTASSIUM CHLORIDE 20 MEQ: 1500 TABLET, EXTENDED RELEASE ORAL at 11:23

## 2018-03-11 RX ADMIN — METOPROLOL TARTRATE 50 MG: 50 TABLET, FILM COATED ORAL at 11:24

## 2018-03-11 RX ADMIN — POLYETHYLENE GLYCOL 3350 17 G: 17 POWDER, FOR SOLUTION ORAL at 00:02

## 2018-03-11 RX ADMIN — MULTIPLE VITAMINS W/ MINERALS TAB 1 TABLET: TAB at 11:23

## 2018-03-11 RX ADMIN — DULOXETINE HYDROCHLORIDE 60 MG: 60 CAPSULE, DELAYED RELEASE ORAL at 11:24

## 2018-03-11 RX ADMIN — POLYETHYLENE GLYCOL 3350 17 G: 17 POWDER, FOR SOLUTION ORAL at 01:00

## 2018-03-11 RX ADMIN — Medication 10 ML: at 11:31

## 2018-03-11 RX ADMIN — POLYETHYLENE GLYCOL 3350 17 G: 17 POWDER, FOR SOLUTION ORAL at 03:00

## 2018-03-11 RX ADMIN — DILTIAZEM HYDROCHLORIDE 120 MG: 120 CAPSULE, COATED, EXTENDED RELEASE ORAL at 11:24

## 2018-03-11 RX ADMIN — POLYETHYLENE GLYCOL 3350 17 G: 17 POWDER, FOR SOLUTION ORAL at 06:25

## 2018-03-11 RX ADMIN — POLYETHYLENE GLYCOL 3350 17 G: 17 POWDER, FOR SOLUTION ORAL at 04:58

## 2018-03-11 RX ADMIN — SPIRONOLACTONE 50 MG: 25 TABLET, FILM COATED ORAL at 11:23

## 2018-03-11 ASSESSMENT — PAIN SCALES - GENERAL
PAINLEVEL_OUTOF10: 8
PAINLEVEL_OUTOF10: 0
PAINLEVEL_OUTOF10: 0
PAINLEVEL_OUTOF10: 9
PAINLEVEL_OUTOF10: 10

## 2018-03-11 ASSESSMENT — PAIN - FUNCTIONAL ASSESSMENT: PAIN_FUNCTIONAL_ASSESSMENT: 0-10

## 2018-03-11 ASSESSMENT — PAIN DESCRIPTION - LOCATION: LOCATION: BACK;LEG

## 2018-03-11 ASSESSMENT — PAIN DESCRIPTION - PAIN TYPE: TYPE: CHRONIC PAIN

## 2018-03-11 NOTE — PLAN OF CARE
Problem: Pain:  Goal: Pain level will decrease  Pain level will decrease   Outcome: Ongoing  Patient has chronic back pain, morphine pain pump implanted in him, fentanyl when needed from [facility], patient refuses pain meds at times even though he has pain     Problem: Falls - Risk of  Goal: Absence of falls  Outcome: Ongoing  Patient has had no falls this shift     Problem: Risk for Impaired Skin Integrity  Goal: Tissue integrity - skin and mucous membranes  Structural intactness and normal physiological function of skin and  mucous membranes. Outcome: Ongoing  Patient has dry mucous membranes, encouraged to drink his water with stool powder for colonoscopy in morning     Problem: Cardiovascular  Goal: Hemodynamic stability  Outcome: Ongoing  Patient blood pressures low at times, patient has some edema generalized. Patient lungs have some wheezes     Problem: DAILY CARE  Goal: Daily care needs are met  Outcome: Ongoing  Patient daily care needs are met with some assistance, patient bed alarm on at night, patient is alert and oriented and is able to ask for help when needed     Comments: Care plan reviewed with patient. Patient verbalize understanding of the plan of care and contribute to goal setting.

## 2018-03-11 NOTE — OP NOTE
patient was placed in the left lateral decubitus position in the Endo  room #13. The patient was placed on appropriate monitoring of vitals  including blood pressure, heart rate and pulse ox. Esophagogastroduodenoscopy report:  Oropharynx was sprayed with Cetacaine  spray and bite-block was placed between maxilla and mandible after the  adequate total intravenous anesthesia was given by Anesthesia, therapeutic  gastroscope was inserted into the oropharynx, and the esophagus was  intubated under direct vision. The scope was advanced down through the  stomach into second portion of duodenum. On careful inspection and on  withdrawal of the scope, the duodenum looks normal as shown in picture #3. In the duodenal bulb, a small nonbleeding ulcer noted as shown in picture  #4. In the angularis of the stomach, the patient had a shallow nonbleeding  ulcer noted as shown in picture #5. In the greater curvature of the  stomach, the patient had multiple nonbleeding shallow ulcers noted as shown  in picture #2. Biopsies obtained for DEVEN-test.  Lesser and greater  curvature and body of the stomach looks normal as shown in picture #6. On  retroflex, fundus looks normal as shown in picture #7. Distal and proximal  esophagus looks normal as shown in picture #1 and 8. Air was withdrawn as  the scope was removed. The patient tolerated the procedure well without  any immediate complications. The patient transferred to recovery room in  stable condition. IMPRESSION:  Esophagogastroduodenoscopy to second portion of duodenum. Multiple nonbleeding shallow ulcers noted in the body of the stomach and  duodenal bulb, most likely accounting for his blood loss anemia. Biopsies  obtained for DEVEN-test.    RECOMMENDATIONS:  Antireflux instructions to stop the Protonix IV. Place  the patient on Protonix 40 mg p.o. b.i.d. Advance the diet to soft diet. Follow the biopsy results.     Thank you Dr. Jerome Cox and Dr. Latosha Glover
May start the anticoagulation with  Coumadin. Follow the biopsy results. Okay to discharge home. Thank you Dr. Magaly Werner for letting me to do procedure on this patient. Do  not hesitate to call me if you have any questions. My recommendations are  above. Yair Raymond M.D.        D: 03/11/2018 10:26:23       T: 03/11/2018 10:28:26     [id number]  Job#: [id number]     Doc#: [id number]    CC:  Casimiro Ortiz. CHRIS Wilks M.D. Katie Grandchild, M.D.

## 2018-03-11 NOTE — DISCHARGE INSTR - DIET

## 2018-03-11 NOTE — BRIEF OP NOTE
Brief Postoperative Note  ______________________________________________________________    Patient: Chris Toro  YOB: 1955  MRN: [id number]  Date of Procedure: 3/11/2018    Pre-Op Diagnosis: DIAGNOSTIC    Post-Op Diagnosis: Same       Procedure(s):  COLONOSCOPY POLYPECTOMY SNARE/COLD BIOPSY    Anesthesia: Anesthesia type not filed in the log. Surgeon(s):  Sheri Mitchell MD    Staff:  Scrub Person First: Ginny Engle     Estimated Blood Loss: * No values recorded between 3/11/2018  9:48 AM and 3/11/2018 31:38 AM * mL    Complications: None    Specimens:   ID Type Source Tests Collected by Time Destination   A : Biospy rectal ulcers Tissue Colon SURGICAL PATHOLOGY Sheri Mitchell MD 3/11/2018 1011        Implants:  * No implants in log *      Drains:      Findings: multiple rectal ulcers.     Sheri Mitchell MD  Date: 3/11/2018  Time: 10:22 AM

## 2018-03-11 NOTE — DISCHARGE SUMMARY
hour intake/output:  Intake/Output Summary (Last 24 hours) at 03/11/18 1433  Last data filed at 03/11/18 0459   Gross per 24 hour   Intake             1050 ml   Output             1300 ml   Net             -250 ml         General appearance:  No apparent distress, appears stated age and cooperative. HEENT:  Normal cephalic, atraumatic without obvious deformity. Pupils equal, round, and reactive to light. Extra ocular muscles intact. Conjunctivae/corneas clear. Neck: Supple, with full range of motion. No jugular venous distention. Trachea midline. Respiratory:  Normal respiratory effort. Clear to auscultation, bilaterally without Rales/Wheezes/Rhonchi. Cardiovascular:   Abdomen: Soft, non-tender, non-distended with normal bowel sounds. Musculoskeletal:  No clubbing, cyanosis or edema bilaterally. Full range of motion without deformity. Skin: Skin color, texture, turgor normal.  No rashes or lesions. Neurologic:  Neurovascularly intact without any focal sensory/motor deficits. Cranial nerves: II-XII intact, grossly non-focal.  Psychiatric:  Alert and oriented, thought content appropriate, normal insight  Capillary Refill: Brisk,< 3 seconds   Peripheral Pulses: +2 palpable, equal bilaterally       Labs:  For convenience and continuity at follow-up the following most recent labs are provided:      CBC:    Lab Results   Component Value Date    WBC 13.0 03/10/2018    HGB 9.5 03/11/2018    HCT 28.1 03/11/2018     03/10/2018       Renal:  Lab Results   Component Value Date     03/11/2018    K 3.3 03/11/2018    K 3.1 03/08/2018     03/11/2018    CO2 26 03/11/2018    BUN 8 03/11/2018    CREATININE 0.7 03/11/2018    CALCIUM 8.4 03/11/2018    PHOS 3.2 09/14/2014         Significant Diagnostic Studies    Radiology:   No orders to display          Consults:     IP CONSULT TO GI  IP CONSULT TO CARDIOLOGY  IP CONSULT TO SOCIAL WORK    Disposition:    [x] Home       [] TCU       [] Rehab       [] Psych [] SNF       [] [place]       [] Other-    Condition at Discharge: Stable    Code Status:  Full Code     Patient Instructions:    Discharge lab work: Activity: activity as tolerated  Diet: DIET LOW FIBER; Carb Control: 4 carbs/meal (approximate 1800 kcals/day)      Follow-up visits:   Laila Mccabe MD  [address] [place] [address]  [phone]               Discharge Medications:      [facility]ig[facility] Candekal Tello Medication Instructions TDT:[id number]    Printed on:03/11/18 5562   Medication Information                      bumetanide (BUMEX) 2 MG tablet  Take 2 mg by mouth daily             busPIRone (BUSPAR) 5 MG tablet  Take 5 mg by mouth 3 times daily as needed (anxiety)              cyclobenzaprine (FLEXERIL) 10 MG tablet  Take 10 mg by mouth 3 times daily as needed for Muscle spasms             digoxin (LANOXIN) 125 MCG tablet  Take 125 mcg by mouth daily. diltiazem (CARDIZEM CD) 120 MG ER capsule  Take 1 capsule by mouth daily. DULoxetine (CYMBALTA) 60 MG extended release capsule  Take 60 mg by mouth daily             ferrous sulfate (LORETTA-JAMIE) 325 (65 Fe) MG tablet  One tab every other day with food; may turn stools dark, cause constipation             glipiZIDE (GLUCOTROL XL) 10 MG extended release tablet  Take 10 mg by mouth daily             lisinopril (PRINIVIL;ZESTRIL) 2.5 MG tablet  Take 2.5 mg by mouth daily. lovastatin (MEVACOR) 40 MG tablet  Take 40 mg by mouth nightly. metoprolol (LOPRESSOR) 50 MG tablet  Take 1 tablet by mouth 2 times daily. MORPHINE SULFATE MICROINFUSION IJ  Inject 4.97 mg/day into the spine continuous. Implantable morphine pump, 4.97 mg/day, with 0.26mg/min bolus doses, 5dose bolus max/3hr. [facility]             Multiple Vitamins-Minerals (THERAPEUTIC MULTIVITAMIN-MINERALS) tablet  Take 1 tablet by mouth daily.              nitroGLYCERIN (NITROSTAT) 0.4 MG SL tablet  Place 0.4 mg under the tongue every 5 minutes as needed for Chest pain. ondansetron (ZOFRAN-ODT) 4 MG disintegrating tablet  Take 4 mg by mouth every 4 hours as needed for Nausea or Vomiting             pantoprazole (PROTONIX) 40 MG tablet  Take 1 tablet by mouth 2 times daily (before meals)             potassium chloride (KLOR-CON M) 20 MEQ extended release tablet  Take 20 mEq by mouth daily             promethazine (PHENERGAN) 12.5 MG tablet  Take 12.5 mg by mouth every 4 hours as needed for Nausea             spironolactone (ALDACTONE) 50 MG tablet  Take 1 tablet by mouth 2 times daily. vitamin C (ASCORBIC ACID) 500 MG tablet  One every other day; take with iron             warfarin (COUMADIN) 2 MG tablet  Take by mouth daily Takes 2 mg every other day alternating with 3 mg every other day             zolpidem (AMBIEN) 10 MG tablet  Take 10 mg by mouth nightly as needed for Sleep . Time Spent on discharge is more than 45 minutes in the examination, evaluation, counseling and review of medications and discharge plan. Signed: Thank you Pam Lopez MD for the opportunity to be involved in this patient's care.     Electronically signed by Marc Norman MD on 3/11/2018 at 2:33 PM

## 2019-10-21 NOTE — PLAN OF CARE
Second-Degree Burn  A burn occurs when skin is exposed to too much heat, sun, or harsh chemicals. A second-degree burn (partial-thickness burn) is deeper than a first-degree burn (superficial burn). It usually causes a blister to form. The blister may remain intact and gradually go away on its own. Or it may break open. The goal of treatment is to relieve pain and stop infection while the burn heals.  Home care  Use pain medicine as directed. If no pain medicine was prescribed, you may use over-the-counter medicine to control pain. If you have chronic liver or kidney disease, talk with your healthcare provider before using acetaminophen or ibuprofen. Also talk with your provider if you've had a stomach ulcer or GI bleeding.  General care  · On the first day, you may put a cool compress on the wound to ease pain. A cool compress is a small towel soaked in cool water.  · If you were sent home with the blister intact, don't break the blister. The risk for infection is greater if the blister breaks. If a bandage was applied, change it once a day, unless told otherwise. If the bandage becomes wet or soiled, change it as soon as you can.  · Sometimes an infection may occur even with proper treatment. Check the burn daily for the signs of infection listed below.  · Eat more calories and protein until your wound is healed.  · Wear a hat, sunscreen, and long sleeves while in the sun to protect the skin.  · Don't pick or scratch at the wound. Use over-the-counter medicines like diphenhydramine for itching.  · Avoid tight-fitting clothes.  To change a bandage:  · Wash your hands.  · Take off the old bandage. If the bandage sticks, soak it off under warm running water.  · Once the bandage is off, gently wash the burn area with mild soap and warm water to remove any cream, ointment, ooze, or scab. You may do this in a sink, under a tub faucet, or in the shower. Rinse off the soap and gently pat dry with a clean towel.  · Check  Problem: Pain:  Goal: Control of chronic pain  Control of chronic pain   Outcome: Ongoing  Pt currently taking 25mg fentanyl IV for chronic pain 10/10. Implanted morphine pump present in R upper abdomen. Problem: Falls - Risk of  Goal: Absence of falls  Outcome: Met This Shift  Patient alert and oriented x4. Bed alarmed armed. Bed wheels locked. Bedside table in reach. Patient up with assistance when ambulating. Patient verbalizes and demonstrates the use of the call light. Hourly rounding being completed. Problem: Risk for Impaired Skin Integrity  Goal: Tissue integrity - skin and mucous membranes  Structural intactness and normal physiological function of skin and  mucous membranes. Outcome: Ongoing  Patient turns self independently. Patient taught to change position frequently to prevent breakdown. No redness noted on pressure points such as elbows, back of head, heels, shoulder blades, or coccyx at this time. Will continue to monitor. Problem: Cardiovascular  Goal: Hemodynamic stability  Outcome: Ongoing  Pt vitals signs stable. 1 unit PRBC given. Most recent hgb 8.8. Will continue to monitor for signs of continued bleeding. Comments: Care plan reviewed with patient. Patient verbalize understanding of the plan of care and contributes to goal setting. for signs of infection listed below.  · Put any prescribed antibiotic cream or ointment on the wound.  · Cover the burn with nonstick gauze. Then wrap it with the bandage material.  Follow-up care  Follow up with your healthcare provider, or as advised.  When to seek medical advice  Call your healthcare provider right away if you have any of these signs of infection:  · Fever of 100.4°F (38°C) or higher, or as directed by your healthcare provider  · Pain that gets worse  · Redness or swelling that gets worse  · Pus comes from the burn  · Red streaks in your skin coming from the burn  · Wound doesn't appear to be healing  · Nausea or vomiting   Date Last Reviewed: 1/1/2017  © 3501-1389 [employer]. [address] [place] [address], [place] [address]. All rights reserved. This information is not intended as a substitute for professional medical care. Always follow your healthcare professional's instructions.

## 2024-03-23 NOTE — CARE COORDINATION
3/13/18, 7:59 AM    DISCHARGE BARRIERS    [facility] called and states they did not receive discharge summary, sw faxed to [facility].
DISCHARGE BARRIERS  3/9/18, 2:20 PM    Reason for Referral: Arrange for [facility]  Mental Status: Patient is alert and oriented. Decision Making: Patient makes his own decisions. Family/Social/Home Environment:  Assessment completed with patient and called Daughter. Patient states he resides in a one story home with one step to enter. Patient states he is independent at home and uses his cane or occasionally his scooter around the house. Pt states he plans to return home alone with daughter helping out and new referral to [facility] for therapy. Current Services: Patient denies current services. Patient states he has had outpt therapy in the past.  Current Equipment: Cane, scooter, shower chair and bars in tub. Payment Source: Medicare. Concerns or Barriers to Discharge: Patient denies barriers. Collabrative List of ECF/HH were provided: SW provided [facility] list.    Teach Back Method used with pt regarding care plan and discharge planning. Patient verbalized understanding of the plan of care and contribute to goal setting. Anticipated Needs/Discharge Plan: Patient wanting to return home alone, states daughter lives close and can help out if needed. Patient is agreeable to [facility] and wants daughter to decide what agency. SW called daughter, Jenaro Braxton, she would like [facility] for therapy PT/OT only unless Medicare requires an RN to go out as well. Jenaro Braxton states she is an RN and doesn't feel nursing visit is needed. Jenaro Braxton states she will try to convince patient to come to her home at discharge, [facility] [place] [address]. SW made referral to [facility] for skilled nursing and PT/OT, [facility] has access to [employer]. and can review medical chart.    Phone number [phone]  Fax number [phone] [facility] order and AVS)    Electronically signed by KWABENA Mars on 3/9/2018 at 2:20 PM
2 = assistive person

## (undated) DEVICE — CANISTER, RIGID, 2000CC: Brand: MEDLINE INDUSTRIES, INC.

## (undated) DEVICE — FORCEP RAD JAW W/NEEDLE 160CM

## (undated) DEVICE — ENDO KIT: Brand: MEDLINE INDUSTRIES, INC.

## (undated) DEVICE — SET LNR RED GRN W/ BASE CLEANASCOPE

## (undated) DEVICE — SET ADMIN 25ML L117IN PMP MOD CK VLV RLER CLMP 2 SMRTSITE

## (undated) DEVICE — Device

## (undated) DEVICE — CONMED SCOPE SAVER BITE BLOCK, 20X27 MM: Brand: SCOPE SAVER

## (undated) DEVICE — FORCEPS BX L240CM JAW DIA3.2MM L CAP W/ NDL MIC MESH TOOTH

## (undated) DEVICE — SOLUTION IV 1000ML 0.45% SOD CHL PH 5 INJ USP VIAFLX PLAS

## (undated) DEVICE — TUBING IV STOPCOCK 48 CM 3 W